# Patient Record
Sex: FEMALE | Race: WHITE | NOT HISPANIC OR LATINO | URBAN - METROPOLITAN AREA
[De-identification: names, ages, dates, MRNs, and addresses within clinical notes are randomized per-mention and may not be internally consistent; named-entity substitution may affect disease eponyms.]

---

## 2017-09-11 ENCOUNTER — RECORDS - HEALTHEAST (OUTPATIENT)
Dept: LAB | Facility: CLINIC | Age: 82
End: 2017-09-11

## 2017-09-11 LAB
CHOLEST SERPL-MCNC: 171 MG/DL
FASTING STATUS PATIENT QL REPORTED: ABNORMAL
HDLC SERPL-MCNC: 42 MG/DL
LDLC SERPL CALC-MCNC: 92 MG/DL
TRIGL SERPL-MCNC: 187 MG/DL

## 2018-09-11 ENCOUNTER — RECORDS - HEALTHEAST (OUTPATIENT)
Dept: LAB | Facility: CLINIC | Age: 83
End: 2018-09-11

## 2018-09-11 LAB
ALBUMIN SERPL-MCNC: 3.7 G/DL (ref 3.5–5)
ALP SERPL-CCNC: 80 U/L (ref 45–120)
ALT SERPL W P-5'-P-CCNC: 27 U/L (ref 0–45)
ANION GAP SERPL CALCULATED.3IONS-SCNC: 10 MMOL/L (ref 5–18)
AST SERPL W P-5'-P-CCNC: 18 U/L (ref 0–40)
BILIRUB SERPL-MCNC: 0.5 MG/DL (ref 0–1)
BUN SERPL-MCNC: 19 MG/DL (ref 8–28)
CALCIUM SERPL-MCNC: 9.6 MG/DL (ref 8.5–10.5)
CHLORIDE BLD-SCNC: 104 MMOL/L (ref 98–107)
CHOLEST SERPL-MCNC: 159 MG/DL
CO2 SERPL-SCNC: 24 MMOL/L (ref 22–31)
CREAT SERPL-MCNC: 1.12 MG/DL (ref 0.6–1.1)
FASTING STATUS PATIENT QL REPORTED: ABNORMAL
GFR SERPL CREATININE-BSD FRML MDRD: 47 ML/MIN/1.73M2
GLUCOSE BLD-MCNC: 230 MG/DL (ref 70–125)
HDLC SERPL-MCNC: 34 MG/DL
LDLC SERPL CALC-MCNC: 98 MG/DL
POTASSIUM BLD-SCNC: 4.7 MMOL/L (ref 3.5–5)
PROT SERPL-MCNC: 6.4 G/DL (ref 6–8)
SODIUM SERPL-SCNC: 138 MMOL/L (ref 136–145)
T4 FREE SERPL-MCNC: 1.4 NG/DL (ref 0.7–1.8)
TRIGL SERPL-MCNC: 135 MG/DL
TSH SERPL DL<=0.005 MIU/L-ACNC: 3.21 UIU/ML (ref 0.3–5)

## 2019-04-08 ENCOUNTER — RECORDS - HEALTHEAST (OUTPATIENT)
Dept: LAB | Facility: CLINIC | Age: 84
End: 2019-04-08

## 2019-04-08 LAB
ALBUMIN SERPL-MCNC: 3.7 G/DL (ref 3.5–5)
ALP SERPL-CCNC: 64 U/L (ref 45–120)
ALT SERPL W P-5'-P-CCNC: 37 U/L (ref 0–45)
ANION GAP SERPL CALCULATED.3IONS-SCNC: 11 MMOL/L (ref 5–18)
AST SERPL W P-5'-P-CCNC: 31 U/L (ref 0–40)
BILIRUB SERPL-MCNC: 0.4 MG/DL (ref 0–1)
BUN SERPL-MCNC: 20 MG/DL (ref 8–28)
CALCIUM SERPL-MCNC: 9.4 MG/DL (ref 8.5–10.5)
CHLORIDE BLD-SCNC: 105 MMOL/L (ref 98–107)
CO2 SERPL-SCNC: 22 MMOL/L (ref 22–31)
CREAT SERPL-MCNC: 1.07 MG/DL (ref 0.6–1.1)
GFR SERPL CREATININE-BSD FRML MDRD: 49 ML/MIN/1.73M2
GLUCOSE BLD-MCNC: 156 MG/DL (ref 70–125)
POTASSIUM BLD-SCNC: 4.9 MMOL/L (ref 3.5–5)
PROT SERPL-MCNC: 5.9 G/DL (ref 6–8)
SODIUM SERPL-SCNC: 138 MMOL/L (ref 136–145)
TSH SERPL DL<=0.005 MIU/L-ACNC: 3.09 UIU/ML (ref 0.3–5)

## 2019-04-10 LAB — BACTERIA SPEC CULT: ABNORMAL

## 2019-10-07 ENCOUNTER — RECORDS - HEALTHEAST (OUTPATIENT)
Dept: LAB | Facility: CLINIC | Age: 84
End: 2019-10-07

## 2019-10-07 LAB
ALBUMIN SERPL-MCNC: 3.9 G/DL (ref 3.5–5)
ALP SERPL-CCNC: 73 U/L (ref 45–120)
ALT SERPL W P-5'-P-CCNC: 15 U/L (ref 0–45)
ANION GAP SERPL CALCULATED.3IONS-SCNC: 8 MMOL/L (ref 5–18)
AST SERPL W P-5'-P-CCNC: 16 U/L (ref 0–40)
BILIRUB SERPL-MCNC: 0.4 MG/DL (ref 0–1)
BUN SERPL-MCNC: 18 MG/DL (ref 8–28)
CALCIUM SERPL-MCNC: 9.6 MG/DL (ref 8.5–10.5)
CHLORIDE BLD-SCNC: 105 MMOL/L (ref 98–107)
CHOLEST SERPL-MCNC: 150 MG/DL
CO2 SERPL-SCNC: 25 MMOL/L (ref 22–31)
CREAT SERPL-MCNC: 1.26 MG/DL (ref 0.6–1.1)
CREAT UR-MCNC: 21.8 MG/DL
FASTING STATUS PATIENT QL REPORTED: ABNORMAL
GFR SERPL CREATININE-BSD FRML MDRD: 41 ML/MIN/1.73M2
GLUCOSE BLD-MCNC: 295 MG/DL (ref 70–125)
HDLC SERPL-MCNC: 46 MG/DL
LDLC SERPL CALC-MCNC: 84 MG/DL
MICROALBUMIN UR-MCNC: <0.5 MG/DL (ref 0–1.99)
MICROALBUMIN/CREAT UR: NORMAL MG/G{CREAT}
POTASSIUM BLD-SCNC: 5.1 MMOL/L (ref 3.5–5)
PROT SERPL-MCNC: 6.2 G/DL (ref 6–8)
SODIUM SERPL-SCNC: 138 MMOL/L (ref 136–145)
TRIGL SERPL-MCNC: 101 MG/DL

## 2021-01-01 ENCOUNTER — COMMUNICATION - HEALTHEAST (OUTPATIENT)
Dept: SCHEDULING | Facility: CLINIC | Age: 86
End: 2021-01-01

## 2021-01-01 ENCOUNTER — RECORDS - HEALTHEAST (OUTPATIENT)
Dept: LAB | Facility: CLINIC | Age: 86
End: 2021-01-01

## 2021-01-01 ENCOUNTER — OFFICE VISIT - HEALTHEAST (OUTPATIENT)
Dept: GERIATRICS | Facility: CLINIC | Age: 86
End: 2021-01-01

## 2021-01-01 ENCOUNTER — AMBULATORY - HEALTHEAST (OUTPATIENT)
Dept: OTHER | Facility: CLINIC | Age: 86
End: 2021-01-01

## 2021-01-01 ENCOUNTER — COMMUNICATION - HEALTHEAST (OUTPATIENT)
Dept: ADMINISTRATIVE | Facility: CLINIC | Age: 86
End: 2021-01-01

## 2021-01-01 ENCOUNTER — AMBULATORY - HEALTHEAST (OUTPATIENT)
Dept: GERIATRICS | Facility: CLINIC | Age: 86
End: 2021-01-01

## 2021-01-01 DIAGNOSIS — E03.9 ACQUIRED HYPOTHYROIDISM: ICD-10-CM

## 2021-01-01 DIAGNOSIS — K44.9 PARAESOPHAGEAL HIATAL HERNIA: ICD-10-CM

## 2021-01-01 DIAGNOSIS — E46 PROTEIN MALNUTRITION (H): ICD-10-CM

## 2021-01-01 DIAGNOSIS — Z71.89 GOALS OF CARE, COUNSELING/DISCUSSION: ICD-10-CM

## 2021-01-01 DIAGNOSIS — I10 ESSENTIAL HYPERTENSION: ICD-10-CM

## 2021-01-01 DIAGNOSIS — E11.9 TYPE 2 DIABETES MELLITUS TREATED WITHOUT INSULIN (H): ICD-10-CM

## 2021-01-01 DIAGNOSIS — N94.89 ADNEXAL MASS: ICD-10-CM

## 2021-01-01 DIAGNOSIS — R62.51 FAILURE TO THRIVE (0-17): ICD-10-CM

## 2021-01-01 DIAGNOSIS — N18.30 STAGE 3 CHRONIC KIDNEY DISEASE, UNSPECIFIED WHETHER STAGE 3A OR 3B CKD (H): ICD-10-CM

## 2021-01-01 DIAGNOSIS — N17.9 ACUTE KIDNEY INJURY (H): ICD-10-CM

## 2021-01-01 DIAGNOSIS — N18.9 ACUTE KIDNEY INJURY SUPERIMPOSED ON CHRONIC KIDNEY DISEASE (H): ICD-10-CM

## 2021-01-01 DIAGNOSIS — N17.9 ACUTE KIDNEY INJURY SUPERIMPOSED ON CHRONIC KIDNEY DISEASE (H): ICD-10-CM

## 2021-01-01 LAB
ALBUMIN SERPL-MCNC: 4 G/DL (ref 3.5–5)
ALP SERPL-CCNC: 60 U/L (ref 45–120)
ALT SERPL W P-5'-P-CCNC: 17 U/L (ref 0–45)
ANION GAP SERPL CALCULATED.3IONS-SCNC: 11 MMOL/L (ref 5–18)
ANION GAP SERPL CALCULATED.3IONS-SCNC: 5 MMOL/L (ref 5–18)
AST SERPL W P-5'-P-CCNC: 20 U/L (ref 0–40)
BILIRUB SERPL-MCNC: 0.5 MG/DL (ref 0–1)
BUN SERPL-MCNC: 18 MG/DL (ref 8–28)
BUN SERPL-MCNC: 20 MG/DL (ref 8–28)
CALCIUM SERPL-MCNC: 7.9 MG/DL (ref 8.5–10.5)
CALCIUM SERPL-MCNC: 9.4 MG/DL (ref 8.5–10.5)
CHLORIDE BLD-SCNC: 111 MMOL/L (ref 98–107)
CHLORIDE BLD-SCNC: 99 MMOL/L (ref 98–107)
CHOLEST SERPL-MCNC: 136 MG/DL
CO2 SERPL-SCNC: 24 MMOL/L (ref 22–31)
CO2 SERPL-SCNC: 25 MMOL/L (ref 22–31)
CREAT SERPL-MCNC: 0.89 MG/DL (ref 0.6–1.1)
CREAT SERPL-MCNC: 0.93 MG/DL (ref 0.6–1.1)
FASTING STATUS PATIENT QL REPORTED: ABNORMAL
GFR SERPL CREATININE-BSD FRML MDRD: 57 ML/MIN/1.73M2
GFR SERPL CREATININE-BSD FRML MDRD: 60 ML/MIN/1.73M2
GLUCOSE BLD-MCNC: 213 MG/DL (ref 70–125)
GLUCOSE BLD-MCNC: 58 MG/DL (ref 70–125)
HDLC SERPL-MCNC: 39 MG/DL
LDLC SERPL CALC-MCNC: 73 MG/DL
MAGNESIUM SERPL-MCNC: 2 MG/DL (ref 1.8–2.6)
POTASSIUM BLD-SCNC: 3.9 MMOL/L (ref 3.5–5)
POTASSIUM BLD-SCNC: 4.2 MMOL/L (ref 3.5–5)
PROT SERPL-MCNC: 6.4 G/DL (ref 6–8)
SODIUM SERPL-SCNC: 135 MMOL/L (ref 136–145)
SODIUM SERPL-SCNC: 140 MMOL/L (ref 136–145)
TRIGL SERPL-MCNC: 121 MG/DL
TSH SERPL DL<=0.005 MIU/L-ACNC: 2.5 UIU/ML (ref 0.3–5)
WBC: 10.5 THOU/UL (ref 4–11)

## 2021-05-25 ENCOUNTER — RECORDS - HEALTHEAST (OUTPATIENT)
Dept: ADMINISTRATIVE | Facility: CLINIC | Age: 86
End: 2021-05-25

## 2021-05-26 ENCOUNTER — RECORDS - HEALTHEAST (OUTPATIENT)
Dept: ADMINISTRATIVE | Facility: CLINIC | Age: 86
End: 2021-05-26

## 2021-05-27 ENCOUNTER — RECORDS - HEALTHEAST (OUTPATIENT)
Dept: ADMINISTRATIVE | Facility: CLINIC | Age: 86
End: 2021-05-27

## 2021-05-27 VITALS
RESPIRATION RATE: 16 BRPM | SYSTOLIC BLOOD PRESSURE: 126 MMHG | HEART RATE: 79 BPM | DIASTOLIC BLOOD PRESSURE: 78 MMHG | OXYGEN SATURATION: 96 % | TEMPERATURE: 98.2 F

## 2021-05-28 ENCOUNTER — RECORDS - HEALTHEAST (OUTPATIENT)
Dept: ADMINISTRATIVE | Facility: CLINIC | Age: 86
End: 2021-05-28

## 2021-05-30 ENCOUNTER — RECORDS - HEALTHEAST (OUTPATIENT)
Dept: ADMINISTRATIVE | Facility: CLINIC | Age: 86
End: 2021-05-30

## 2021-06-15 NOTE — PROGRESS NOTES
[facility]    Facility:   [facility] [[id number]]   Code Status: DNR      CHIEF COMPLAINT/REASON FOR VISIT:  Chief Complaint   Patient presents with     Problem Visit     tcu, asked to see, lencho babin, left francisco mass.       HISTORY:      HPI: Yamila is a 85 y.o. female who was hospitalized February 26, 2021 secondary to evaluation of generalized weakness, fatigue, decreased appetite and diarrhea for about 1 week.  She denies abdominal pain fevers or chills.  Her work-up did include a negative urinalysis along with a BUN of 52 creatinine 2.35 magnesium 2.5 albumin 2.7 white cells of 10.6 and hemoglobin 10.9 and platelets 180.  The CT of the abdomen and pelvis did show a very large popliteal hiatal hernia defect through which nearly the entire stomach and the transverse colon mass.  An indeterminate 10 x 7 x 7 cm complex left adnexal mass to which she had ultrasound of the pelvis which did concur the indeterminate septated cystic lesion in the region of the left adnexa with no normal ovarian tissue identified.  Gynecological consult was recommended.  The CA-125 is 23.6.  CT of the head did not show any evidence of acute intracranial process she does have presumed chronic vascular ischemic changes.  She did have a general surgery consult regarding the hiatal hernia as well as discussion of the hernias being a challenge since the current hernia is large enough that they have: Inside of it.  An open surgery in this patient would be substantial stress to recover from.  In the hospital he did have a palliative care consult.  Regarding the palliative care consult there was a discussion of whether or not she could go on hospice but only if she decided not to have the surgery.  Regarding the diarrhea the CT did show mural thickening throughout the rectum and possibly the ascending colon.  He also has a history of diabetes currently on glipizide as well as a history of hypertension to  which they did hold the losartan secondary to SEBASTIAN.  She now is currently in the transitional care unit to which I did enjoy our visit today.  Very delightful female.  She at this point is not completely convinced that she does want the surgery and therefore did have a chance to talk with her 2 daughters Meredith and Jillian at this point it appears that there is consensus that hospice would be a good decision.  She does have protein malnutrition is very deconditioned and still there is also suspicion if she does have enough cognition and insight to make this decision personally.  The meantime I will check her Accu-Cheks to see if she needs to be on the glipizide also give her boost twice daily per her request.  I went over her laboratory studies.  She has been normotensive and afebrile and also on room air.  Most recent studies on March 9 that showed creatinine now down at 1.07 and hemoglobin 9.5.  The A1c was 7.1 whereas back in August it was 6.6.    Past Medical History:   Diagnosis Date     Diabetes mellitus, type II (H)      Hyperlipidemia      Hypothyroidism      Osteoporosis              Family History   Problem Relation Age of Onset     Colon cancer Mother 74     Breast cancer Sister 48     Breast cancer Maternal Aunt      BRCA 1/2 Neg Hx      Cancer Neg Hx      Endometrial cancer Neg Hx      Ovarian cancer Neg Hx      Social History     Socioeconomic History     Marital status:      Spouse name: Not on file     Number of children: Not on file     Years of education: Not on file     Highest education level: Not on file   Occupational History     Not on file   Social Needs     Financial resource strain: Not on file     Food insecurity     Worry: Not on file     Inability: Not on file     Transportation needs     Medical: Not on file     Non-medical: Not on file   Tobacco Use     Smoking status: Not on file   Substance and Sexual Activity     Alcohol use: Not on file     Drug use: Not on file     Sexual  activity: Not on file   Lifestyle     Physical activity     Days per week: Not on file     Minutes per session: Not on file     Stress: Not on file   Relationships     Social connections     Talks on phone: Not on file     Gets together: Not on file     Attends Religion service: Not on file     Active member of club or organization: Not on file     Attends meetings of clubs or organizations: Not on file     Relationship status: Not on file     Intimate partner violence     Fear of current or ex partner: Not on file     Emotionally abused: Not on file     Physically abused: Not on file     Forced sexual activity: Not on file   Other Topics Concern     Not on file   Social History Narrative     Not on file         Review of Systems  She currently denies any new symptoms of fever chills fatigue cough or cold sore throat postnasal drip wheezing chest pain dizziness vertigo nausea vomiting diarrhea dysuria or headaches.  Does admit to generalized decreased appetite and p.o. intake as well as interest in nutrition.    Current Outpatient Medications   Medication Sig     acetaminophen (TYLENOL) 325 MG tablet Take 2 tablets (650 mg total) by mouth every 4 (four) hours as needed (for pain 1-5/10).     chlorhexidine (PERIDEX) 0.12 % solution Apply 15 mL to the mouth or throat 2 (two) times a day for 10 days.     glipiZIDE (GLUCOTROL XL) 5 MG 24 hr tablet Take 1 tablet (5 mg total) by mouth every evening.     levothyroxine (SYNTHROID, LEVOTHROID) 50 MCG tablet Take 50 mcg by mouth Daily at 6:00 am.     mecobalamin, vitamin B12, 1,000 mcg TbDL Place 1 tablet (1,000 mcg total) under the tongue daily.     melatonin 3 mg Tab tablet Take 1 tablet (3 mg total) by mouth at bedtime.     metoclopramide (REGLAN) 5 MG tablet Take 1 tablet (5 mg total) by mouth 3 (three) times a day before meals for 10 days.     mirtazapine (REMERON) 7.5 MG tablet Take 1 tablet (7.5 mg total) by mouth at bedtime.     multivitamin with minerals (THERA-M) 9  mg iron-400 mcg Tab tablet Take 1 tablet by mouth daily.     omeprazole (PRILOSEC) 20 MG capsule Take 1 capsule (20 mg total) by mouth daily before breakfast.     senna-docusate (PERICOLACE) 8.6-50 mg tablet Take 1 tablet by mouth daily as needed for constipation.     thiamine 100 MG tablet Take 1 tablet (100 mg total) by mouth daily.     traZODone (DESYREL) 50 MG tablet Take 0.5 tablets (25 mg total) by mouth at bedtime as needed for sleep.     zinc sulfate (ZINCATE) 50 mg zinc (220 mg) capsule Take 1 capsule (220 mg total) by mouth daily.       There were no vitals filed for this visit.  Blood pressure 130/84, pulse 78, respirations 18, temperature 97.5 and saturation room air 96%  Physical Exam  Pleasant female in no acute distress.  Head is normocephalic.  Conjunctiva is pink and sclerae clear.  Neck supple without adenopathy.  No thyroid nodules or thyroidomegaly.  No carotid bruits.  Lung sounds are clear throughout.  Cardiovascular S1-S2 regular rate and rhythm.  No lower extremity edema.  Gastrointestinal soft nondistended nontender.  Musculoskeletal generalized weakness and deconditioning.  Denies discomfort.  Psychiatric: Pleasant affect.  LABS:   Lab Results   Component Value Date    OMYYZCIN86 289 02/27/2021     Vitamin D, Total (25-Hydroxy)   Date Value Ref Range Status   04/25/2011 46.1 30.0 - 80.0 ng/mL Final     Comment:                    Deficiency              <10.0 ng/mL               Insufficiency       10.0-29.9 ng/mL               Sufficiency         30.0-80.0 ng/mL               Toxicity (possible)    >100.0 ng/mL     Lab Results   Component Value Date    ALT 25 03/01/2021    AST 20 03/01/2021    ALKPHOS 47 03/01/2021    BILITOT 0.4 03/01/2021     Lab Results   Component Value Date    ALBUMIN 1.9 (L) 03/01/2021     Results for orders placed or performed during the hospital encounter of 02/26/21   Basic Metabolic Panel   Result Value Ref Range    Sodium 141 136 - 145 mmol/L    Potassium 4.6  3.5 - 5.0 mmol/L    Chloride 111 (H) 98 - 107 mmol/L    CO2 25 22 - 31 mmol/L    Anion Gap, Calculation 5 5 - 18 mmol/L    Glucose 144 (H) 70 - 125 mg/dL    Calcium 7.8 (L) 8.5 - 10.5 mg/dL    BUN 18 8 - 28 mg/dL    Creatinine 1.07 0.60 - 1.10 mg/dL    GFR MDRD Af Amer 59 (L) >60 mL/min/1.73m2    GFR MDRD Non Af Amer 49 (L) >60 mL/min/1.73m2       Lab Results   Component Value Date    WBC 12.2 (H) 03/09/2021    HGB 9.5 (L) 03/09/2021    HCT 30.0 (L) 03/09/2021    MCV 88 03/09/2021     03/09/2021     Lab Results   Component Value Date    HGBA1C 7.1 (H) 02/27/2021         ASSESSMENT:      ICD-10-CM    1. Adnexal mass  N94.89    2. Protein malnutrition (H)  E46    3. Essential hypertension  I10    4. Type 2 diabetes mellitus treated without insulin (H)  E11.9        PLAN:    Had a pleasant visit with her today.  We will do Accu-Cheks at least once a day see what her blood sugars are running and then also give her boost twice daily.  Also remove the PICC line in the right bicep.  Discussed the rehabilitation process also previous laboratory studies.    Documentation from total visit 45 minutes which were 50% initially was spent with patient going over her hospitalization as well as her hernia as well as decisions for future treatments for no treatments whatsoever as well as her nutrition, medications, previous laboratory studies, and rehabilitation services and the remainder the time spent talking with her 2 daughters Meredith and Jillian.      Electronically signed by: Michael Duane Johnson, CNP

## 2021-06-15 NOTE — PROGRESS NOTES
[facility]      Facility:    [facility] [[id number]]  Code Status: DNR      Chief Complaint/Reason for Visit:  Chief Complaint   Patient presents with     H & P     Acute kidney injury on stage III chronic kidney disease, hypothyroidism, type 2 diabetes, encounter for palliative care, essential hypertension, hypothyroidism.       HPI:   Yamila is a 85 y.o. female who was recently admitted to the hospital on 2/26/2021.  Brought into the hospital for signs and symptoms of fatigue decreased appetite diarrhea x1 week and generalized weakness.  She has severe malnutrition early dementia and was in acute kidney injury did receive IV fluids.  She had very large hiatal hernia and CT scan was done without contrast showed no obstruction or hydronephrosis.  P creatinine is 2.35 then returned to normal limits.  She did have a non-anion gap metabolic acidosis did resolve and her diarrhea with colitis the diarrhea did resolve.  There is mural thickening throughout the rectum and possibly ascending colon.  Was not able to check C. difficile.  She have type 2 diabetes with chronic kidney disease without long-term insulin use last A1c was 7.1 which is appropriate for her age.  She also had essential hypertension but her blood pressures were in the hospital stay pretty much normotensive and she does have hypothyroidism and she appears euthyroid by exam.  She have low magnesium that was replaced and we will check that here an elevated white count.    Patient is lying in bed comfortably we did get her seated position I did examine her back there are no open lesions but there is some redness along the thoracic spine which she has a kyphotic curve.  This is likely due to pressure in the bed.  She is not in any pain at this time and denies any fevers chills nausea vomiting diarrhea.    Past Medical History:  Past Medical History:   Diagnosis Date     Diabetes mellitus, type II (H)       Hyperlipidemia      Hypothyroidism      Osteoporosis            Surgical History:  Past Surgical History:   Procedure Laterality Date     HYSTERECTOMY       PICC AND MIDLINE TEAM LINE INSERTION  2/28/2021          NJ APPENDECTOMY      Description: Appendectomy;  Recorded: 05/12/2008;     NJ ARTHRODESIS,ANKLE,OPEN      Description: Ankle Arthrodesis Right;  Recorded: 05/12/2008;  Comments: fusion     NJ REMOVAL GALLBLADDER      Description: Cholecystectomy;  Recorded: 05/12/2008;     NJ REMOVAL OF TONSILS,<13 Y/O      Description: Tonsillectomy;  Recorded: 05/12/2008;     NJ TOTAL ABDOM HYSTERECTOMY      Description: Hysterectomy;  Recorded: 05/12/2008;  Comments: ovaries intact; '76     NJ TOTAL KNEE ARTHROPLASTY      Description: Total Knee Arthroplasty;  Recorded: 08/21/2012;     NJ TREAT INTER/SUBTROCH FX,W/PLATE/SCREW      Description: Open Treatment Of A Pertrochanteric Fracture;  Recorded: 05/12/2008;  Comments: 11/06       Family History:   Family History   Problem Relation Age of Onset     Colon cancer Mother 74     Breast cancer Sister 48     Breast cancer Maternal Aunt      BRCA 1/2 Neg Hx      Cancer Neg Hx      Endometrial cancer Neg Hx      Ovarian cancer Neg Hx        Social History:    Social History     Socioeconomic History     Marital status:      Spouse name: None     Number of children: None     Years of education: None     Highest education level: None   Occupational History     None   Social Needs     Financial resource strain: None     Food insecurity     Worry: None     Inability: None     Transportation needs     Medical: None     Non-medical: None   Tobacco Use     Smoking status: None   Substance and Sexual Activity     Alcohol use: None     Drug use: None     Sexual activity: None   Lifestyle     Physical activity     Days per week: None     Minutes per session: None     Stress: None   Relationships     Social connections     Talks on phone: None     Gets together: None     Attends  Druze service: None     Active member of club or organization: None     Attends meetings of clubs or organizations: None     Relationship status: None     Intimate partner violence     Fear of current or ex partner: None     Emotionally abused: None     Physically abused: None     Forced sexual activity: None   Other Topics Concern     None   Social History Narrative     None          Review of Systems   Constitutional:        Patient denies any pain fevers chills nausea vomit diarrhea change in vision hearing taste or smell weakness one-sided chest pain shortness of breath.  Denies any current shortness no polyphagia polydipsia polyuria depression anxiety and the main review of systems is negative.       Vitals:    03/11/21 1300   BP: 126/78   Pulse: 79   Resp: 16   Temp: 98.2  F (36.8  C)   SpO2: 96%       Physical Exam  Constitutional:       General: She is not in acute distress.  HENT:      Head: Normocephalic and atraumatic.      Nose: Nose normal.      Mouth/Throat:      Mouth: Mucous membranes are moist.      Pharynx: Oropharynx is clear.   Eyes:      General:         Right eye: No discharge.         Left eye: No discharge.   Cardiovascular:      Rate and Rhythm: Normal rate and regular rhythm.   Pulmonary:      Effort: Pulmonary effort is normal. No respiratory distress.      Comments: Decreased inspiratory volume.  There were no crackles rales or wheezes.  Musculoskeletal:      Right lower leg: Edema present.      Left lower leg: Edema present.   Skin:     General: Skin is warm and dry.      Comments: Jesus is noted on the upper right thoracic area in the paraspinous muscles.  It is soft and no real palpable hard mass.   Neurological:      Mental Status: She is alert. Mental status is at baseline.   Psychiatric:         Mood and Affect: Mood normal.         Behavior: Behavior normal.         Medication List:  Current Outpatient Medications   Medication Sig     acetaminophen (TYLENOL) 325 MG tablet Take  2 tablets (650 mg total) by mouth every 4 (four) hours as needed (for pain 1-5/10).     chlorhexidine (PERIDEX) 0.12 % solution Apply 15 mL to the mouth or throat 2 (two) times a day for 10 days.     glipiZIDE (GLUCOTROL XL) 5 MG 24 hr tablet Take 1 tablet (5 mg total) by mouth every evening.     levothyroxine (SYNTHROID, LEVOTHROID) 50 MCG tablet Take 50 mcg by mouth Daily at 6:00 am.     mecobalamin, vitamin B12, 1,000 mcg TbDL Place 1 tablet (1,000 mcg total) under the tongue daily.     melatonin 3 mg Tab tablet Take 1 tablet (3 mg total) by mouth at bedtime.     metoclopramide (REGLAN) 5 MG tablet Take 1 tablet (5 mg total) by mouth 3 (three) times a day before meals for 10 days.     mirtazapine (REMERON) 7.5 MG tablet Take 1 tablet (7.5 mg total) by mouth at bedtime.     multivitamin with minerals (THERA-M) 9 mg iron-400 mcg Tab tablet Take 1 tablet by mouth daily.     omeprazole (PRILOSEC) 20 MG capsule Take 1 capsule (20 mg total) by mouth daily before breakfast.     senna-docusate (PERICOLACE) 8.6-50 mg tablet Take 1 tablet by mouth daily as needed for constipation.     thiamine 100 MG tablet Take 1 tablet (100 mg total) by mouth daily.     traZODone (DESYREL) 50 MG tablet Take 0.5 tablets (25 mg total) by mouth at bedtime as needed for sleep.     zinc sulfate (ZINCATE) 50 mg zinc (220 mg) capsule Take 1 capsule (220 mg total) by mouth daily.       Labs: On 3/9/2021 sodium is 141, potassium 4.6, chloride was 111, CO2 is 25, anion gap was 5, glucose was 144, calcium was 7.8, BUN was 18, creatinine 1.07, GFR was 49.  White count was 12.2, hemoglobin is 9.5, platelets of 259,000.  Vitamin B1 was 271.      Assessment:    ICD-10-CM    1. Adnexal mass  N94.89    2. Protein malnutrition (H)  E46    3. Essential hypertension  I10    4. Type 2 diabetes mellitus treated without insulin (H)  E11.9    5. Acute kidney injury (H)  N17.9    6. Stage 3 chronic kidney disease, unspecified whether stage 3a or 3b CKD  N18.30     7. Acquired hypothyroidism  E03.9    8. Paraesophageal hiatal hernia  K44.9        Plan: This time speech therapy evaluate and treat registered dietitian will also evaluate and treat house supplement will be given 3 times daily likely with protein.  Mighty shakes would be appropriate at this time.  Weights to be done a daily basis to assess nutrition and hydration and a basic metabolic profile white count be done tomorrow second acute kidney injury as well as leukocytosis.  Will check magnesium tomorrow secondary low magnesium levels and continue with physical and Occupational Therapy.  We will also check blood sugars 4 times daily at this time and continue to monitor above medical problems.  No other changes to care plan at this time.        Electronically signed by: Adolfo Jaramillo DO

## 2021-06-15 NOTE — PROGRESS NOTES
[facility]    Facility:   [facility] [[id number]]   Code Status: DNR      CHIEF COMPLAINT/REASON FOR VISIT:  Chief Complaint   Patient presents with     Problem Visit     medm, mgmt, rehab, sleep       HISTORY:      HPI: Yamila is a 85 y.o. female who remains in the transitional care unit room 210 and who I had the opportunity to revisit with not only secondary to her hospitalization February 26, 2021 secondary generalized weakness and fatigue as well as a very large hiatal hernia defect through the entire stomach and transverse colon mass and also a indeterminate left adnexal mass but also discussed her medications, diabetes, nutrition and goals of care.  Regarding the goals of care she at this point is considering hospice type of care but did not want to make that decision herself today which I certainly on an respect and she wants to talk to family members.  She does admit to generalized decline and generalized fatigue and overall not getting any better.  In the meantime does not have a significant appetite whatsoever.  There have not been any recent weights.  Her blood sugars have been less than 140 so the glipizide will not be discontinued including the Accu-Cheks.  Last Tylenol given as needed was on March 12.  I will discontinue the melatonin and mirtazapine.  She does not feel depressed but the mirtazapine was given to her with the hope of the side effect of improving her appetite.  She does not want the melatonin.  She also would like to discontinue the B12, multivitamin, thiamine, and zinc.  She is even thinking about discontinue the Synthroid but we will keep that on the docket for little while longer.  Otherwise has had meetings with family and pretty much is bedbound at this time.    Past Medical History:   Diagnosis Date     Diabetes mellitus, type II (H)      Hyperlipidemia      Hypothyroidism      Osteoporosis              Family History   Problem Relation  Age of Onset     Colon cancer Mother 74     Breast cancer Sister 48     Breast cancer Maternal Aunt      BRCA 1/2 Neg Hx      Cancer Neg Hx      Endometrial cancer Neg Hx      Ovarian cancer Neg Hx      Social History     Socioeconomic History     Marital status:      Spouse name: Not on file     Number of children: Not on file     Years of education: Not on file     Highest education level: Not on file   Occupational History     Not on file   Social Needs     Financial resource strain: Not on file     Food insecurity     Worry: Not on file     Inability: Not on file     Transportation needs     Medical: Not on file     Non-medical: Not on file   Tobacco Use     Smoking status: Not on file   Substance and Sexual Activity     Alcohol use: Not on file     Drug use: Not on file     Sexual activity: Not on file   Lifestyle     Physical activity     Days per week: Not on file     Minutes per session: Not on file     Stress: Not on file   Relationships     Social connections     Talks on phone: Not on file     Gets together: Not on file     Attends Sabianism service: Not on file     Active member of club or organization: Not on file     Attends meetings of clubs or organizations: Not on file     Relationship status: Not on file     Intimate partner violence     Fear of current or ex partner: Not on file     Emotionally abused: Not on file     Physically abused: Not on file     Forced sexual activity: Not on file   Other Topics Concern     Not on file   Social History Narrative     Not on file         Review of Systems  She currently denies any new symptoms of fever chills fatigue cough or cold sore throat postnasal drip wheezing chest pain dizziness vertigo nausea vomiting diarrhea dysuria or headaches.  Does admit to generalized decreased appetite and p.o. intake as well as interest in nutrition.      Current Outpatient Medications:      acetaminophen (TYLENOL) 325 MG tablet, Take 2 tablets (650 mg total) by mouth  every 4 (four) hours as needed (for pain 1-5/10)., Disp:  , Rfl: 0     chlorhexidine (PERIDEX) 0.12 % solution, Apply 15 mL to the mouth or throat 2 (two) times a day for 10 days., Disp: , Rfl: 0     levothyroxine (SYNTHROID, LEVOTHROID) 50 MCG tablet, Take 50 mcg by mouth Daily at 6:00 am., Disp: , Rfl:      metoclopramide (REGLAN) 5 MG tablet, Take 1 tablet (5 mg total) by mouth 3 (three) times a day before meals for 10 days., Disp: , Rfl: 0     omeprazole (PRILOSEC) 20 MG capsule, Take 1 capsule (20 mg total) by mouth daily before breakfast., Disp:  , Rfl: 0     senna-docusate (PERICOLACE) 8.6-50 mg tablet, Take 1 tablet by mouth daily as needed for constipation., Disp:  , Rfl: 0     thiamine 100 MG tablet, Take 1 tablet (100 mg total) by mouth daily., Disp:  , Rfl: 0     traZODone (DESYREL) 50 MG tablet, Take 0.5 tablets (25 mg total) by mouth at bedtime as needed for sleep., Disp: , Rfl: 0    There were no vitals filed for this visit.  Blood pressure 121/73, pulse 65, temperature 98.2 and saturation room air 97%  Physical Exam  Pleasant female in no acute distress.  Head is normocephalic.  Conjunctiva is pink and sclerae clear.  Neck supple without adenopathy. .  Lung sounds are clear throughout.  Cardiovascular S1-S2 regular rate and rhythm.  No lower extremity edema.  Gastrointestinal soft nondistended nontender.  Musculoskeletal generalized weakness and deconditioning.  Denies discomfort.  Psychiatric: Pleasant affect.      LABS:   Lab Results   Component Value Date    ALBUMIN 1.9 (L) 03/01/2021     Results for orders placed or performed in visit on 03/12/21   Basic Metabolic Panel   Result Value Ref Range    Sodium 140 136 - 145 mmol/L    Potassium 3.9 3.5 - 5.0 mmol/L    Chloride 111 (H) 98 - 107 mmol/L    CO2 24 22 - 31 mmol/L    Anion Gap, Calculation 5 5 - 18 mmol/L    Glucose 58 (LL) 70 - 125 mg/dL    Calcium 7.9 (L) 8.5 - 10.5 mg/dL    BUN 18 8 - 28 mg/dL    Creatinine 0.89 0.60 - 1.10 mg/dL    GFR  MDRD Af Amer >60 >60 mL/min/1.73m2    GFR MDRD Non Af Amer 60 (L) >60 mL/min/1.73m2           ASSESSMENT:      ICD-10-CM    1. Paraesophageal hiatal hernia  K44.9    2. Goals of care, counseling/discussion  Z71.89    3. Type 2 diabetes mellitus treated without insulin (H)  E11.9    4. Acquired hypothyroidism  E03.9        PLAN:    She at this point would like to discontinue a number of medications that I aforementioned.  I did try to call the daughter Jillian today but there was no answer.  She is strongly considering hospice due to at this point unable to withstand a major surgery to fix her hiatal hernia as well as the uncertainty of the adnexal mass.  Her appetite is not significant whatsoever and barely getting even in the nutrient supplements.  She states that she is comfortable.  She will let me know in the near future what her plans are.        Electronically signed by: Michael Duane Johnson, CNP

## 2021-06-15 NOTE — PROGRESS NOTES
[facility]    Facility:   [facility] [[id number]]   Code Status: DNR      CHIEF COMPLAINT/REASON FOR VISIT:  Chief Complaint   Patient presents with     Problem Visit     ftt, prot mal, dm       HISTORY:      HPI: Yamila is a 85 y.o. female who I had the pleasure to revisit with today not only secondary to her hospitalization February 26, 2021 secondary generalized weakness and fatigue along with diarrhea however her work-up did include a very large hiatal hernia defect through the entire stomach and transverse colon mass as well as an indeterminant left adnexal mass but also discussing rehabilitation, laboratory studies, blood pressures and nutrition.  Regarding therapy she has initiated therapy she does have generalized weakness.  She does have a wonderful 2 of cirrhosis in her room.  She has been normotensive and afebrile and also on room air.  Weight 91 pounds.  Blood sugars ranging  glipizide at 5 mg and will monitor over the weekend perhaps drop the glipizide altogether.  For pain she did take a Tylenol yesterday for sleep she is on melatonin mirtazapine and trazodone.  She does have a BMP magnesium and white cell ordered for today.  Getting extra nutrient supplements.  We did have a wonderful healthy conversation today regarding palliative and discussion of goals of care including hospice care due to the hospital findings as well as the difficulty for any form of recovery as well as the purpose of hospice and comfort.  She was amenable to the conversation and actually did ask a number of questions and did have a nice reciprocity of information.    Past Medical History:   Diagnosis Date     Diabetes mellitus, type II (H)      Hyperlipidemia      Hypothyroidism      Osteoporosis              Family History   Problem Relation Age of Onset     Colon cancer Mother 74     Breast cancer Sister 48     Breast cancer Maternal Aunt      BRCA 1/2 Neg Hx      Cancer Neg Hx       Endometrial cancer Neg Hx      Ovarian cancer Neg Hx      Social History     Socioeconomic History     Marital status:      Spouse name: Not on file     Number of children: Not on file     Years of education: Not on file     Highest education level: Not on file   Occupational History     Not on file   Social Needs     Financial resource strain: Not on file     Food insecurity     Worry: Not on file     Inability: Not on file     Transportation needs     Medical: Not on file     Non-medical: Not on file   Tobacco Use     Smoking status: Not on file   Substance and Sexual Activity     Alcohol use: Not on file     Drug use: Not on file     Sexual activity: Not on file   Lifestyle     Physical activity     Days per week: Not on file     Minutes per session: Not on file     Stress: Not on file   Relationships     Social connections     Talks on phone: Not on file     Gets together: Not on file     Attends [employer] service: Not on file     Active member of club or organization: Not on file     Attends meetings of clubs or organizations: Not on file     Relationship status: Not on file     Intimate partner violence     Fear of current or ex partner: Not on file     Emotionally abused: Not on file     Physically abused: Not on file     Forced sexual activity: Not on file   Other Topics Concern     Not on file   Social History Narrative     Not on file         Review of Systems  She currently denies any new symptoms of fever chills fatigue cough or cold sore throat postnasal drip wheezing chest pain dizziness vertigo nausea vomiting diarrhea dysuria or headaches.  Does admit to generalized decreased appetite and p.o. intake as well as interest in nutrition.      Current Outpatient Medications:      acetaminophen (TYLENOL) 325 MG tablet, Take 2 tablets (650 mg total) by mouth every 4 (four) hours as needed (for pain 1-5/10)., Disp:  , Rfl: 0     chlorhexidine (PERIDEX) 0.12 % solution, Apply 15 mL to the mouth or  throat 2 (two) times a day for 10 days., Disp: , Rfl: 0     glipiZIDE (GLUCOTROL XL) 5 MG 24 hr tablet, Take 1 tablet (5 mg total) by mouth every evening., Disp: , Rfl: 0     levothyroxine (SYNTHROID, LEVOTHROID) 50 MCG tablet, Take 50 mcg by mouth Daily at 6:00 am., Disp: , Rfl:      mecobalamin, vitamin B12, 1,000 mcg TbDL, Place 1 tablet (1,000 mcg total) under the tongue daily., Disp:  , Rfl: 0     melatonin 3 mg Tab tablet, Take 1 tablet (3 mg total) by mouth at bedtime., Disp:  , Rfl: 0     metoclopramide (REGLAN) 5 MG tablet, Take 1 tablet (5 mg total) by mouth 3 (three) times a day before meals for 10 days., Disp: , Rfl: 0     mirtazapine (REMERON) 7.5 MG tablet, Take 1 tablet (7.5 mg total) by mouth at bedtime., Disp:  , Rfl: 0     multivitamin with minerals (THERA-M) 9 mg iron-400 mcg Tab tablet, Take 1 tablet by mouth daily., Disp:  , Rfl: 0     omeprazole (PRILOSEC) 20 MG capsule, Take 1 capsule (20 mg total) by mouth daily before breakfast., Disp:  , Rfl: 0     senna-docusate (PERICOLACE) 8.6-50 mg tablet, Take 1 tablet by mouth daily as needed for constipation., Disp:  , Rfl: 0     thiamine 100 MG tablet, Take 1 tablet (100 mg total) by mouth daily., Disp:  , Rfl: 0     traZODone (DESYREL) 50 MG tablet, Take 0.5 tablets (25 mg total) by mouth at bedtime as needed for sleep., Disp: , Rfl: 0     zinc sulfate (ZINCATE) 50 mg zinc (220 mg) capsule, Take 1 capsule (220 mg total) by mouth daily., Disp:  , Rfl: 0    There were no vitals filed for this visit.  Blood pressure 126/65, pulse 80, respirations 16 and temperature 97.7  Physical Exam  Pleasant female in no acute distress.  Head is normocephalic.  Conjunctiva is pink and sclerae clear.  Neck supple without adenopathy.  No thyroid nodules or thyroidomegaly.  Lung sounds are clear throughout.  Cardiovascular S1-S2 regular rate and rhythm.  No lower extremity edema.  Gastrointestinal soft nondistended nontender.  Musculoskeletal generalized weakness and  deconditioning.  Denies discomfort.  Psychiatric: Pleasant affect.    LABS:   Lab Results   Component Value Date    ALT 25 03/01/2021    AST 20 03/01/2021    ALKPHOS 47 03/01/2021    BILITOT 0.4 03/01/2021     Results for orders placed or performed during the hospital encounter of 02/26/21   Basic Metabolic Panel   Result Value Ref Range    Sodium 141 136 - 145 mmol/L    Potassium 4.6 3.5 - 5.0 mmol/L    Chloride 111 (H) 98 - 107 mmol/L    CO2 25 22 - 31 mmol/L    Anion Gap, Calculation 5 5 - 18 mmol/L    Glucose 144 (H) 70 - 125 mg/dL    Calcium 7.8 (L) 8.5 - 10.5 mg/dL    BUN 18 8 - 28 mg/dL    Creatinine 1.07 0.60 - 1.10 mg/dL    GFR MDRD Af Amer 59 (L) >60 mL/min/1.73m2    GFR MDRD Non Af Amer 49 (L) >60 mL/min/1.73m2       Lab Results   Component Value Date    ALBUMIN 1.9 (L) 03/01/2021         ASSESSMENT:      ICD-10-CM    1. Paraesophageal hiatal hernia  K44.9    2. Type 2 diabetes mellitus treated without insulin (H)  E11.9    3. Goals of care, counseling/discussion  Z71.89    4. Essential hypertension  I10        PLAN:    We did have a healthy conversation today and overall she is not opposed to the hospice idea but would still like to contemplate and consider her options over the weekend and continue with therapy through the weekend.  In the meantime we will continue to manage and follow her chronic medical conditions as well as her wishes.    For documentation purposes total visit 35 minutes which over 50% was spent with the patient going over her care, medications, previous laboratory studies, nutrition extra nutrient supplements, pain management as well as discussion of palliative and hospice care.      Electronically signed by: Michael Duane Johnson, CNP

## 2021-06-15 NOTE — TELEPHONE ENCOUNTER
The NP from  Palliative Care called asking to get a message to Dr. Delgado to call the patient's daughter Jillian. She did not specify why and was in a hurry. The patient does not look to have a Consent to Communicate on file. Jillian can be reached at [phone].

## 2021-06-16 PROBLEM — N94.89 ADNEXAL MASS: Status: ACTIVE | Noted: 2021-01-01

## 2021-06-16 PROBLEM — R19.7 DIARRHEA: Status: ACTIVE | Noted: 2021-01-01

## 2021-06-16 PROBLEM — N17.9 ACUTE RENAL FAILURE SUPERIMPOSED ON STAGE 3 CHRONIC KIDNEY DISEASE, UNSPECIFIED ACUTE RENAL FAILURE TYPE, UNSPECIFIED WHETHER STAGE 3A OR 3B CKD (H): Status: ACTIVE | Noted: 2021-01-01

## 2021-06-16 PROBLEM — N94.9 ADNEXAL CYST: Status: ACTIVE | Noted: 2021-01-01

## 2021-06-16 PROBLEM — N18.30 ACUTE RENAL FAILURE SUPERIMPOSED ON STAGE 3 CHRONIC KIDNEY DISEASE, UNSPECIFIED ACUTE RENAL FAILURE TYPE, UNSPECIFIED WHETHER STAGE 3A OR 3B CKD (H): Status: ACTIVE | Noted: 2021-01-01

## 2021-06-16 NOTE — PROGRESS NOTES
Code Status:  DNR  Visit Type: Discharge Summary     Facility:  [facility] [[id number]]         Facility Type: [facility] (Long Term Care, LTC)  PCP:  Myriam Whitehead CNP    Phone:  [phone]    Fax:  [phone]      Discharge Diagnosis:  Generalized weakness and fatigue with FTT    History of Present Illness: Yamila Chavez is a 85 y.o. female who I am seeing today for discharge from LTC. Pt recently admitted to LTC from the TCU.  Patient recently hospitalized on 2/26/2021 secondary to generalized weakness and fatigue along with diarrhea.  Past medical history includes severe malnutrition, early dementia, acute kidney injury on chronic kidney disease stage III, colitis and hiatal hernia. Patient found to have a large esophageal hiatal hernia with intrathoracic stomach and part of the transverse colon.  This has been present for many years.  She was placed on PPI.  Patient declined surgery. This could be contributory to her severe malnutrition.  Patient with weight loss of 4.6 pounds in less than 1 month.  Decreased oral intake.  Severe fat loss.  Loss of taste progressive.  Covid negative.  Thiamine replaced.  B12 replaced.  Patient started on zinc.  Swallow study showed patient should avoid straws.  Complex left cystic adnexal mass.  TVUS showed 9.4 x 5.8 x 6.2 cm complex cystic mass with few thin septations.  Left ovary absent with vascularity along the periphery of complex cystic structure.   >23.  Diarrhea with colitis. CT showed apparent mural thickening throughout the rectum and possibly the ascending colon. Unable to collect specimens as no further diarrhea.  Acute kidney injury on chronic kidney disease stage III.  CTA of the abdomen pelvis without contrast showed no obstruction or hydronephrosis.  Her creatinine peaked to 2.35.  Metabolic acidosis secondary to renal failure diarrhea.  This did resolve with fluids.  Type 2 diabetes mellitus without long-term insulin.   Hemoglobin A1c 7.1 on 2/27/2021.  Glipizide held secondary to malnutrition.  Hypertension.  Losartan held.  Hypothyroidism on supplement.  TSH on 2/21 was 2.5.  Cognitive impairment with acute mild encephalopathy.  Slums 13.  CT of the head negative.  B12 low normal and replaced.  Folate normal.  Thiamine replaced.  Patient started on Remeron 7.5 mg.  Vitamin B12 insufficiency.  B12 supplemented.    Today patient sitting up in wheelchair.  Cognitive impairment noted.  She is very thin and frail.  Nursing staff report continued poor oral intake.  She continues on Tylenol for pain.  Continues on Remeron for appetite stimulant.  I did talk with both nursing and the  today.  Patient plans to discharge home on hospice.  They have hired round-the-clock care.  We are recommending 24-hour care.  Blood pressure satisfactory.  TSH on supplement.  Nursing staff reports she is having regular bowel movements.  She continues on Peridex mouthwash for poor dentition.  Patient oral supplements must of been discontinued during her TCU stay for she is no longer on vitamin B12, thiamine or zinc.      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      Hypothyroidism      Essential hypertension      Urinary Frequency      Osteoporosis      Hyperlipidemia      Herpes Zoster (Shingles)      Adenocarcinoma Of The Large Intestine      Anxiety      Arthritis      Type 2 diabetes mellitus treated without insulin (H)      Acute renal failure superimposed on stage 3 chronic kidney disease, unspecified acute renal failure type, unspecified whether stage 3a or 3b CKD (H) 02/26/2021     Diarrhea 02/27/2021     Adnexal cyst 02/27/2021     Paraesophageal hiatal hernia      Encounter for palliative care      Goals of  care, counseling/discussion      Adnexal mass 03/10/2021     Resolved Ambulatory Problems     Diagnosis Date Noted     Fatigue      Past Medical History:   Diagnosis Date     Diabetes mellitus, type II (H)      Hyperlipidemia        Discharge Medications:    Outpatient Encounter Medications as of 3/16/2021   Medication Sig Dispense Refill     acetaminophen (TYLENOL) 325 MG tablet Take 2 tablets (650 mg total) by mouth every 4 (four) hours as needed (for pain 1-5/10).  0     chlorhexidine (PERIDEX) 0.12 % solution Apply 15 mL to the mouth or throat 2 (two) times a day for 10 days.  0     levothyroxine (SYNTHROID, LEVOTHROID) 50 MCG tablet Take 50 mcg by mouth Daily at 6:00 am.       metoclopramide (REGLAN) 5 MG tablet Take 1 tablet (5 mg total) by mouth 3 (three) times a day before meals for 10 days.  0     omeprazole (PRILOSEC) 20 MG capsule Take 1 capsule (20 mg total) by mouth daily before breakfast.  0     senna-docusate (PERICOLACE) 8.6-50 mg tablet Take 1 tablet by mouth daily as needed for constipation.  0     thiamine 100 MG tablet Take 1 tablet (100 mg total) by mouth daily.  0     traZODone (DESYREL) 50 MG tablet Take 0.5 tablets (25 mg total) by mouth at bedtime as needed for sleep.  0     No facility-administered encounter medications on file as of 3/16/2021.      Allergies   Allergen Reactions     Atorvastatin      Hydrocodone-Acetaminophen          Review of Systems   No fevers or chills. No headache, lightheadedness or dizziness. No SOB, chest pains or palpitations. Appetite is poor.  No nausea, vomiting, constipation or diarrhea. No dysuria, frequency, burning or pain with urination.  Patient denies any pain.  Otherwise review of systems are negative.     Physical Exam   PHYSICAL EXAMINATION:  Vital signs: /70, pulse 83, respirations 20, temperature 98.3, O2 sat 90% on room air.  Weight 91.8 pounds.  General: Awake, Alert, oriented x1, appropriately, follows simple commands  HEENT:PERRLA, Pink  conjunctiva, anicteric sclerae, dry oral mucosa.  Poor dentition  NECK: Supple, without any lymphadenopathy, or masses  CVS:  S1  S2, without murmur or gallop.   LUNG: Clear to auscultation, No wheezes, rales or rhonci.  Somewhat diminished in the bases.  BACK: Moderate kyphosis of the thoracic spine  ABDOMEN: Soft, thin, nontender to palpation, with positive bowel sounds  EXTREMITIES: Moves both upper and lower extremities with diffuse weakness, trace pedal edema, no calf tenderness.  Thin and frail extremities.  SKIN: Warm and dry  NEUROLOGIC: Intact, pulses palpable  PSYCHIATRIC: Cognitive impairment noted.            Labs:  All labs reviewed in the nursing home record.    Assessment and Plan:  1. Paraesophageal hiatal hernia  Pt plans to sign onto hospice upon discharge home.    2. Adnexal mass  Family is going to forgo follow up.    3. Protein malnutrition (H)  Continues with poor oral nutrition. Started on Remeron for appetite stimulant.    4. Acute kidney injury superimposed on chronic kidney disease (H)  Creatine 0.89.    5. Type 2 diabetes mellitus treated without insulin (H)  Diet liberalized 2/t to poor oral intake.    6. Acquired hypothyroidism  Continues on supplement.    7. Essential hypertension  Satisfactory controlled.    8. Failure to thrive (0-17)       Ok to discharge home with current meds. Hospice eval and treat. We are recommending 24 hour care.       Greater than 35 minutes spent in coordination of care including interviewing pt and collaborating with nursing staff and  for discharge.     Electronically signed by: Myriam Whitehead, ABRAHAM

## 2021-06-21 NOTE — LETTER
Letter by Deborah Melchor RN at      Author: Deborah Melchor RN Service: -- Author Type: --    Filed:  Encounter Date: 3/23/2021 Status: (Other)       [facility]  [facility]  [address]   [place] [address]        Yamilamanuelito Chavez  [address] [address]        Dear Yamila    We have reviewed the Health Care Directive dated 8-24-05 which you presented for addition to your medical record. Unfortunately, our review indicates the document is not legally valid for the following reason(s): Your signature was not witnessed or notarized.  In order to create a legal document you will need to have your signature witnessed by 2 people or notarized. Per state statute witnesses and notaries cannot be named as Health Care Agents in your document. Also, only one witness can be an employee of the Health Care  system caring for you at the time you completed your document.          We greatly value the opportunity to assist you in documenting your choices and to honor your   wishes. We apologize for any inconvenience. We have several options to assist you in updating   your document so it meets legal requirements.     COPIES of completed Health Care Directives can be brought or mailed to any of our   locations, including the address listed above. You can also email a copy to [email or web address] .     For additional assistance or questions you can email us at [email or web address] or call [phone]      Sincerely,     Katya [facility]  [facility]  Email us: [email or web address] Call us: [phone]  Visit at: [email or web address]

## 2021-06-21 NOTE — LETTER
Letter by Johnson, Michael Duane, CNP at      Author: Johnson, Michael Duane, CNP Service: -- Author Type: --    Filed:  Encounter Date: 3/10/2021 Status: (Other)         Patient: Yamila Chavez   MR Number: [id number]   YOB: 1935   Date of Visit: 3/10/2021     [facility]    Facility:   [facility] [[id number]]   Code Status: DNR      CHIEF COMPLAINT/REASON FOR VISIT:  Chief Complaint   Patient presents with   ? Problem Visit     tcu, asked to see, talya, gilbertoti, left francisco mass.       HISTORY:      HPI: Yamila is a 85 y.o. female who was hospitalized February 26, 2021 secondary to evaluation of generalized weakness, fatigue, decreased appetite and diarrhea for about 1 week.  She denies abdominal pain fevers or chills.  Her work-up did include a negative urinalysis along with a BUN of 52 creatinine 2.35 magnesium 2.5 albumin 2.7 white cells of 10.6 and hemoglobin 10.9 and platelets 180.  The CT of the abdomen and pelvis did show a very large popliteal hiatal hernia defect through which nearly the entire stomach and the transverse colon mass.  An indeterminate 10 x 7 x 7 cm complex left adnexal mass to which she had ultrasound of the pelvis which did concur the indeterminate septated cystic lesion in the region of the left adnexa with no normal ovarian tissue identified.  Gynecological consult was recommended.  The CA-125 is 23.6.  CT of the head did not show any evidence of acute intracranial process she does have presumed chronic vascular ischemic changes.  She did have a general surgery consult regarding the hiatal hernia as well as discussion of the hernias being a challenge since the current hernia is large enough that they have: Inside of it.  An open surgery in this patient would be substantial stress to recover from.  In the hospital he did have a palliative care consult.  Regarding the palliative care consult there was a discussion of whether or not she  could go on hospice but only if she decided not to have the surgery.  Regarding the diarrhea the CT did show mural thickening throughout the rectum and possibly the ascending colon.  He also has a history of diabetes currently on glipizide as well as a history of hypertension to which they did hold the losartan secondary to SEBASTIAN.  She now is currently in the transitional care unit to which I did enjoy our visit today.  Very delightful female.  She at this point is not completely convinced that she does want the surgery and therefore did have a chance to talk with her 2 daughters Meredith and Jillian at this point it appears that there is consensus that hospice would be a good decision.  She does have protein malnutrition is very deconditioned and still there is also suspicion if she does have enough cognition and insight to make this decision personally.  The meantime I will check her Accu-Cheks to see if she needs to be on the glipizide also give her boost twice daily per her request.  I went over her laboratory studies.  She has been normotensive and afebrile and also on room air.  Most recent studies on March 9 that showed creatinine now down at 1.07 and hemoglobin 9.5.  The A1c was 7.1 whereas back in August it was 6.6.    Past Medical History:   Diagnosis Date   ? Diabetes mellitus, type II (H)    ? Hyperlipidemia    ? Hypothyroidism    ? Osteoporosis              Family History   Problem Relation Age of Onset   ? Colon cancer Mother 74   ? Breast cancer Sister 48   ? Breast cancer Maternal Aunt    ? BRCA 1/2 Neg Hx    ? Cancer Neg Hx    ? Endometrial cancer Neg Hx    ? Ovarian cancer Neg Hx      Social History     Socioeconomic History   ? Marital status:      Spouse name: Not on file   ? Number of children: Not on file   ? Years of education: Not on file   ? Highest education level: Not on file   Occupational History   ? Not on file   Social Needs   ? Financial resource strain: Not on file   ? Food  insecurity     Worry: Not on file     Inability: Not on file   ? Transportation needs     Medical: Not on file     Non-medical: Not on file   Tobacco Use   ? Smoking status: Not on file   Substance and Sexual Activity   ? Alcohol use: Not on file   ? Drug use: Not on file   ? Sexual activity: Not on file   Lifestyle   ? Physical activity     Days per week: Not on file     Minutes per session: Not on file   ? Stress: Not on file   Relationships   ? Social connections     Talks on phone: Not on file     Gets together: Not on file     Attends Buddhism service: Not on file     Active member of club or organization: Not on file     Attends meetings of clubs or organizations: Not on file     Relationship status: Not on file   ? Intimate partner violence     Fear of current or ex partner: Not on file     Emotionally abused: Not on file     Physically abused: Not on file     Forced sexual activity: Not on file   Other Topics Concern   ? Not on file   Social History Narrative   ? Not on file         Review of Systems  She currently denies any new symptoms of fever chills fatigue cough or cold sore throat postnasal drip wheezing chest pain dizziness vertigo nausea vomiting diarrhea dysuria or headaches.  Does admit to generalized decreased appetite and p.o. intake as well as interest in nutrition.    Current Outpatient Medications   Medication Sig   ? acetaminophen (TYLENOL) 325 MG tablet Take 2 tablets (650 mg total) by mouth every 4 (four) hours as needed (for pain 1-5/10).   ? chlorhexidine (PERIDEX) 0.12 % solution Apply 15 mL to the mouth or throat 2 (two) times a day for 10 days.   ? glipiZIDE (GLUCOTROL XL) 5 MG 24 hr tablet Take 1 tablet (5 mg total) by mouth every evening.   ? levothyroxine (SYNTHROID, LEVOTHROID) 50 MCG tablet Take 50 mcg by mouth Daily at 6:00 am.   ? mecobalamin, vitamin B12, 1,000 mcg TbDL Place 1 tablet (1,000 mcg total) under the tongue daily.   ? melatonin 3 mg Tab tablet Take 1 tablet (3 mg  total) by mouth at bedtime.   ? metoclopramide (REGLAN) 5 MG tablet Take 1 tablet (5 mg total) by mouth 3 (three) times a day before meals for 10 days.   ? mirtazapine (REMERON) 7.5 MG tablet Take 1 tablet (7.5 mg total) by mouth at bedtime.   ? multivitamin with minerals (THERA-M) 9 mg iron-400 mcg Tab tablet Take 1 tablet by mouth daily.   ? omeprazole (PRILOSEC) 20 MG capsule Take 1 capsule (20 mg total) by mouth daily before breakfast.   ? senna-docusate (PERICOLACE) 8.6-50 mg tablet Take 1 tablet by mouth daily as needed for constipation.   ? thiamine 100 MG tablet Take 1 tablet (100 mg total) by mouth daily.   ? traZODone (DESYREL) 50 MG tablet Take 0.5 tablets (25 mg total) by mouth at bedtime as needed for sleep.   ? zinc sulfate (ZINCATE) 50 mg zinc (220 mg) capsule Take 1 capsule (220 mg total) by mouth daily.       There were no vitals filed for this visit.  Blood pressure 130/84, pulse 78, respirations 18, temperature 97.5 and saturation room air 96%  Physical Exam  Pleasant female in no acute distress.  Head is normocephalic.  Conjunctiva is pink and sclerae clear.  Neck supple without adenopathy.  No thyroid nodules or thyroidomegaly.  No carotid bruits.  Lung sounds are clear throughout.  Cardiovascular S1-S2 regular rate and rhythm.  No lower extremity edema.  Gastrointestinal soft nondistended nontender.  Musculoskeletal generalized weakness and deconditioning.  Denies discomfort.  Psychiatric: Pleasant affect.  LABS:   Lab Results   Component Value Date    OALOEJZX79 289 02/27/2021     Vitamin D, Total (25-Hydroxy)   Date Value Ref Range Status   04/25/2011 46.1 30.0 - 80.0 ng/mL Final     Comment:                    Deficiency              <10.0 ng/mL               Insufficiency       10.0-29.9 ng/mL               Sufficiency         30.0-80.0 ng/mL               Toxicity (possible)    >100.0 ng/mL     Lab Results   Component Value Date    ALT 25 03/01/2021    AST 20 03/01/2021    ALKPHOS 47  03/01/2021    BILITOT 0.4 03/01/2021     Lab Results   Component Value Date    ALBUMIN 1.9 (L) 03/01/2021     Results for orders placed or performed during the hospital encounter of 02/26/21   Basic Metabolic Panel   Result Value Ref Range    Sodium 141 136 - 145 mmol/L    Potassium 4.6 3.5 - 5.0 mmol/L    Chloride 111 (H) 98 - 107 mmol/L    CO2 25 22 - 31 mmol/L    Anion Gap, Calculation 5 5 - 18 mmol/L    Glucose 144 (H) 70 - 125 mg/dL    Calcium 7.8 (L) 8.5 - 10.5 mg/dL    BUN 18 8 - 28 mg/dL    Creatinine 1.07 0.60 - 1.10 mg/dL    GFR MDRD Af Amer 59 (L) >60 mL/min/1.73m2    GFR MDRD Non Af Amer 49 (L) >60 mL/min/1.73m2       Lab Results   Component Value Date    WBC 12.2 (H) 03/09/2021    HGB 9.5 (L) 03/09/2021    HCT 30.0 (L) 03/09/2021    MCV 88 03/09/2021     03/09/2021     Lab Results   Component Value Date    HGBA1C 7.1 (H) 02/27/2021         ASSESSMENT:      ICD-10-CM    1. Adnexal mass  N94.89    2. Protein malnutrition (H)  E46    3. Essential hypertension  I10    4. Type 2 diabetes mellitus treated without insulin (H)  E11.9        PLAN:    Had a pleasant visit with her today.  We will do Accu-Cheks at least once a day see what her blood sugars are running and then also give her boost twice daily.  Also remove the PICC line in the right bicep.  Discussed the rehabilitation process also previous laboratory studies.    Documentation from total visit 45 minutes which were 50% initially was spent with patient going over her hospitalization as well as her hernia as well as decisions for future treatments for no treatments whatsoever as well as her nutrition, medications, previous laboratory studies, and rehabilitation services and the remainder the time spent talking with her 2 daughters Meredith and Jillian.      Electronically signed by: Michael Duane Johnson, CNP

## 2021-06-21 NOTE — LETTER
Letter by Johnson, Michael Duane, CNP at      Author: Johnson, Michael Duane, CNP Service: -- Author Type: --    Filed:  Encounter Date: 3/12/2021 Status: (Other)         Patient: Yamila Chavez   MR Number: [id number]   YOB: 1935   Date of Visit: 3/12/2021     [facility]    Facility:   [facility] [[id number]]   Code Status: DNR      CHIEF COMPLAINT/REASON FOR VISIT:  Chief Complaint   Patient presents with   ? Problem Visit     ftt, prot mal, dm       HISTORY:      HPI: Yamila is a 85 y.o. female who I had the pleasure to revisit with today not only secondary to her hospitalization February 26, 2021 secondary generalized weakness and fatigue along with diarrhea however her work-up did include a very large hiatal hernia defect through the entire stomach and transverse colon mass as well as an indeterminant left adnexal mass but also discussing rehabilitation, laboratory studies, blood pressures and nutrition.  Regarding therapy she has initiated therapy she does have generalized weakness.  She does have a wonderful 2 of cirrhosis in her room.  She has been normotensive and afebrile and also on room air.  Weight 91 pounds.  Blood sugars ranging  glipizide at 5 mg and will monitor over the weekend perhaps drop the glipizide altogether.  For pain she did take a Tylenol yesterday for sleep she is on melatonin mirtazapine and trazodone.  She does have a BMP magnesium and white cell ordered for today.  Getting extra nutrient supplements.  We did have a wonderful healthy conversation today regarding palliative and discussion of goals of care including hospice care due to the hospital findings as well as the difficulty for any form of recovery as well as the purpose of hospice and comfort.  She was amenable to the conversation and actually did ask a number of questions and did have a nice reciprocity of information.    Past Medical History:   Diagnosis Date    ? Diabetes mellitus, type II (H)    ? Hyperlipidemia    ? Hypothyroidism    ? Osteoporosis              Family History   Problem Relation Age of Onset   ? Colon cancer Mother 74   ? Breast cancer Sister 48   ? Breast cancer Maternal Aunt    ? BRCA 1/2 Neg Hx    ? Cancer Neg Hx    ? Endometrial cancer Neg Hx    ? Ovarian cancer Neg Hx      Social History     Socioeconomic History   ? Marital status:      Spouse name: Not on file   ? Number of children: Not on file   ? Years of education: Not on file   ? Highest education level: Not on file   Occupational History   ? Not on file   Social Needs   ? Financial resource strain: Not on file   ? Food insecurity     Worry: Not on file     Inability: Not on file   ? Transportation needs     Medical: Not on file     Non-medical: Not on file   Tobacco Use   ? Smoking status: Not on file   Substance and Sexual Activity   ? Alcohol use: Not on file   ? Drug use: Not on file   ? Sexual activity: Not on file   Lifestyle   ? Physical activity     Days per week: Not on file     Minutes per session: Not on file   ? Stress: Not on file   Relationships   ? Social connections     Talks on phone: Not on file     Gets together: Not on file     Attends Gnosticism service: Not on file     Active member of club or organization: Not on file     Attends meetings of clubs or organizations: Not on file     Relationship status: Not on file   ? Intimate partner violence     Fear of current or ex partner: Not on file     Emotionally abused: Not on file     Physically abused: Not on file     Forced sexual activity: Not on file   Other Topics Concern   ? Not on file   Social History Narrative   ? Not on file         Review of Systems  She currently denies any new symptoms of fever chills fatigue cough or cold sore throat postnasal drip wheezing chest pain dizziness vertigo nausea vomiting diarrhea dysuria or headaches.  Does admit to generalized decreased appetite and p.o. intake as well as  interest in nutrition.      Current Outpatient Medications:   ?  acetaminophen (TYLENOL) 325 MG tablet, Take 2 tablets (650 mg total) by mouth every 4 (four) hours as needed (for pain 1-5/10)., Disp:  , Rfl: 0  ?  chlorhexidine (PERIDEX) 0.12 % solution, Apply 15 mL to the mouth or throat 2 (two) times a day for 10 days., Disp: , Rfl: 0  ?  glipiZIDE (GLUCOTROL XL) 5 MG 24 hr tablet, Take 1 tablet (5 mg total) by mouth every evening., Disp: , Rfl: 0  ?  levothyroxine (SYNTHROID, LEVOTHROID) 50 MCG tablet, Take 50 mcg by mouth Daily at 6:00 am., Disp: , Rfl:   ?  mecobalamin, vitamin B12, 1,000 mcg TbDL, Place 1 tablet (1,000 mcg total) under the tongue daily., Disp:  , Rfl: 0  ?  melatonin 3 mg Tab tablet, Take 1 tablet (3 mg total) by mouth at bedtime., Disp:  , Rfl: 0  ?  metoclopramide (REGLAN) 5 MG tablet, Take 1 tablet (5 mg total) by mouth 3 (three) times a day before meals for 10 days., Disp: , Rfl: 0  ?  mirtazapine (REMERON) 7.5 MG tablet, Take 1 tablet (7.5 mg total) by mouth at bedtime., Disp:  , Rfl: 0  ?  multivitamin with minerals (THERA-M) 9 mg iron-400 mcg Tab tablet, Take 1 tablet by mouth daily., Disp:  , Rfl: 0  ?  omeprazole (PRILOSEC) 20 MG capsule, Take 1 capsule (20 mg total) by mouth daily before breakfast., Disp:  , Rfl: 0  ?  senna-docusate (PERICOLACE) 8.6-50 mg tablet, Take 1 tablet by mouth daily as needed for constipation., Disp:  , Rfl: 0  ?  thiamine 100 MG tablet, Take 1 tablet (100 mg total) by mouth daily., Disp:  , Rfl: 0  ?  traZODone (DESYREL) 50 MG tablet, Take 0.5 tablets (25 mg total) by mouth at bedtime as needed for sleep., Disp: , Rfl: 0  ?  zinc sulfate (ZINCATE) 50 mg zinc (220 mg) capsule, Take 1 capsule (220 mg total) by mouth daily., Disp:  , Rfl: 0    There were no vitals filed for this visit.  Blood pressure 126/65, pulse 80, respirations 16 and temperature 97.7  Physical Exam  Pleasant female in no acute distress.  Head is normocephalic.  Conjunctiva is pink and  sclerae clear.  Neck supple without adenopathy.  No thyroid nodules or thyroidomegaly.  Lung sounds are clear throughout.  Cardiovascular S1-S2 regular rate and rhythm.  No lower extremity edema.  Gastrointestinal soft nondistended nontender.  Musculoskeletal generalized weakness and deconditioning.  Denies discomfort.  Psychiatric: Pleasant affect.    LABS:   Lab Results   Component Value Date    ALT 25 03/01/2021    AST 20 03/01/2021    ALKPHOS 47 03/01/2021    BILITOT 0.4 03/01/2021     Results for orders placed or performed during the hospital encounter of 02/26/21   Basic Metabolic Panel   Result Value Ref Range    Sodium 141 136 - 145 mmol/L    Potassium 4.6 3.5 - 5.0 mmol/L    Chloride 111 (H) 98 - 107 mmol/L    CO2 25 22 - 31 mmol/L    Anion Gap, Calculation 5 5 - 18 mmol/L    Glucose 144 (H) 70 - 125 mg/dL    Calcium 7.8 (L) 8.5 - 10.5 mg/dL    BUN 18 8 - 28 mg/dL    Creatinine 1.07 0.60 - 1.10 mg/dL    GFR MDRD Af Amer 59 (L) >60 mL/min/1.73m2    GFR MDRD Non Af Amer 49 (L) >60 mL/min/1.73m2       Lab Results   Component Value Date    ALBUMIN 1.9 (L) 03/01/2021         ASSESSMENT:      ICD-10-CM    1. Paraesophageal hiatal hernia  K44.9    2. Type 2 diabetes mellitus treated without insulin (H)  E11.9    3. Goals of care, counseling/discussion  Z71.89    4. Essential hypertension  I10        PLAN:    We did have a healthy conversation today and overall she is not opposed to the hospice idea but would still like to contemplate and consider her options over the weekend and continue with therapy through the weekend.  In the meantime we will continue to manage and follow her chronic medical conditions as well as her wishes.    For documentation purposes total visit 35 minutes which over 50% was spent with the patient going over her care, medications, previous laboratory studies, nutrition extra nutrient supplements, pain management as well as discussion of palliative and hospice care.      Electronically  signed by: Michael Duane Johnson, CNP

## 2021-06-21 NOTE — LETTER
Letter by Myriam Whitehead CNP at      Author: Myriam Whitehead CNP Service: -- Author Type: --    Filed:  Encounter Date: 3/16/2021 Status: (Other)         Patient: Yamila Chavez   MR Number: [id number]   YOB: 1935   Date of Visit: 3/16/2021     Code Status:  DNR  Visit Type: Discharge Summary     Facility:  [facility] [[id number]]         Facility Type:  (Long Term Care, LTC)  PCP:  Myriam Whitehead CNP    Phone:  [phone]    Fax:  [phone]      Discharge Diagnosis:  Generalized weakness and fatigue with FTT    History of Present Illness: Yamila Chavez is a 85 y.o. female who I am seeing today for discharge from LTC. Pt recently admitted to LTC from the TCU.  Patient recently hospitalized on 2/26/2021 secondary to generalized weakness and fatigue along with diarrhea.  Past medical history includes severe malnutrition, early dementia, acute kidney injury on chronic kidney disease stage III, colitis and hiatal hernia. Patient found to have a large esophageal hiatal hernia with intrathoracic stomach and part of the transverse colon.  This has been present for many years.  She was placed on PPI.  Patient declined surgery. This could be contributory to her severe malnutrition.  Patient with weight loss of 4.6 pounds in less than 1 month.  Decreased oral intake.  Severe fat loss.  Loss of taste progressive.  Covid negative.  Thiamine replaced.  B12 replaced.  Patient started on zinc.  Swallow study showed patient should avoid straws.  Complex left cystic adnexal mass.  TVUS showed 9.4 x 5.8 x 6.2 cm complex cystic mass with few thin septations.  Left ovary absent with vascularity along the periphery of complex cystic structure.   >23.  Diarrhea with colitis. CT showed apparent mural thickening throughout the rectum and possibly the ascending colon. Unable to collect specimens as no further diarrhea.  Acute kidney injury on chronic kidney disease  stage III.  CTA of the abdomen pelvis without contrast showed no obstruction or hydronephrosis.  Her creatinine peaked to 2.35.  Metabolic acidosis secondary to renal failure diarrhea.  This did resolve with fluids.  Type 2 diabetes mellitus without long-term insulin.  Hemoglobin A1c 7.1 on 2/27/2021.  Glipizide held secondary to malnutrition.  Hypertension.  Losartan held.  Hypothyroidism on supplement.  TSH on 2/21 was 2.5.  Cognitive impairment with acute mild encephalopathy.  Slums 13.  CT of the head negative.  B12 low normal and replaced.  Folate normal.  Thiamine replaced.  Patient started on Remeron 7.5 mg.  Vitamin B12 insufficiency.  B12 supplemented.    Today patient sitting up in wheelchair.  Cognitive impairment noted.  She is very thin and frail.  Nursing staff report continued poor oral intake.  She continues on Tylenol for pain.  Continues on Remeron for appetite stimulant.  I did talk with both nursing and the  today.  Patient plans to discharge home on hospice.  They have hired round-the-clock care.  We are recommending 24-hour care.  Blood pressure satisfactory.  TSH on supplement.  Nursing staff reports she is having regular bowel movements.  She continues on Peridex mouthwash for poor dentition.  Patient oral supplements must of been discontinued during her TCU stay for she is no longer on vitamin B12, thiamine or zinc.      Active Ambulatory Problems     Diagnosis Date Noted   ? Mammogram - Abnormal    ? Vitamin D Deficiency    ? Constipation    ? Urinary Incontinence    ? Allergies    ? Postherpetic Neuralgia    ? 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 Hypothyroidism    ? Essential hypertension    ? Urinary Frequency    ? Osteoporosis    ? Hyperlipidemia    ? Herpes Zoster (Shingles)    ? Adenocarcinoma Of The Large Intestine    ? Anxiety    ? Arthritis    ? Type 2 diabetes mellitus treated without insulin (H)    ? Acute renal failure  superimposed on stage 3 chronic kidney disease, unspecified acute renal failure type, unspecified whether stage 3a or 3b CKD (H) 02/26/2021   ? Diarrhea 02/27/2021   ? Adnexal cyst 02/27/2021   ? Paraesophageal hiatal hernia    ? Encounter for palliative care    ? Goals of care, counseling/discussion    ? Adnexal mass 03/10/2021     Resolved Ambulatory Problems     Diagnosis Date Noted   ? Fatigue      Past Medical History:   Diagnosis Date   ? Diabetes mellitus, type II (H)    ? Hyperlipidemia        Discharge Medications:    Outpatient Encounter Medications as of 3/16/2021   Medication Sig Dispense Refill   ? acetaminophen (TYLENOL) 325 MG tablet Take 2 tablets (650 mg total) by mouth every 4 (four) hours as needed (for pain 1-5/10).  0   ? chlorhexidine (PERIDEX) 0.12 % solution Apply 15 mL to the mouth or throat 2 (two) times a day for 10 days.  0   ? levothyroxine (SYNTHROID, LEVOTHROID) 50 MCG tablet Take 50 mcg by mouth Daily at 6:00 am.     ? metoclopramide (REGLAN) 5 MG tablet Take 1 tablet (5 mg total) by mouth 3 (three) times a day before meals for 10 days.  0   ? omeprazole (PRILOSEC) 20 MG capsule Take 1 capsule (20 mg total) by mouth daily before breakfast.  0   ? senna-docusate (PERICOLACE) 8.6-50 mg tablet Take 1 tablet by mouth daily as needed for constipation.  0   ? thiamine 100 MG tablet Take 1 tablet (100 mg total) by mouth daily.  0   ? traZODone (DESYREL) 50 MG tablet Take 0.5 tablets (25 mg total) by mouth at bedtime as needed for sleep.  0     No facility-administered encounter medications on file as of 3/16/2021.      Allergies   Allergen Reactions   ? Atorvastatin    ? Hydrocodone-Acetaminophen          Review of Systems   No fevers or chills. No headache, lightheadedness or dizziness. No SOB, chest pains or palpitations. Appetite is poor.  No nausea, vomiting, constipation or diarrhea. No dysuria, frequency, burning or pain with urination.  Patient denies any pain.  Otherwise review of  systems are negative.     Physical Exam   PHYSICAL EXAMINATION:  Vital signs: /70, pulse 83, respirations 20, temperature 98.3, O2 sat 90% on room air.  Weight 91.8 pounds.  General: Awake, Alert, oriented x1, appropriately, follows simple commands  HEENT:PERRLA, Pink conjunctiva, anicteric sclerae, dry oral mucosa.  Poor dentition  NECK: Supple, without any lymphadenopathy, or masses  CVS:  S1  S2, without murmur or gallop.   LUNG: Clear to auscultation, No wheezes, rales or rhonci.  Somewhat diminished in the bases.  BACK: Moderate kyphosis of the thoracic spine  ABDOMEN: Soft, thin, nontender to palpation, with positive bowel sounds  EXTREMITIES: Moves both upper and lower extremities with diffuse weakness, trace pedal edema, no calf tenderness.  Thin and frail extremities.  SKIN: Warm and dry  NEUROLOGIC: Intact, pulses palpable  PSYCHIATRIC: Cognitive impairment noted.            Labs:  All labs reviewed in the nursing home record.    Assessment and Plan:  1. Paraesophageal hiatal hernia  Pt plans to sign onto hospice upon discharge home.    2. Adnexal mass  Family is going to forgo follow up.    3. Protein malnutrition (H)  Continues with poor oral nutrition. Started on Remeron for appetite stimulant.    4. Acute kidney injury superimposed on chronic kidney disease (H)  Creatine 0.89.    5. Type 2 diabetes mellitus treated without insulin (H)  Diet liberalized 2/t to poor oral intake.    6. Acquired hypothyroidism  Continues on supplement.    7. Essential hypertension  Satisfactory controlled.    8. Failure to thrive (0-17)       Ok to discharge home with current meds. Hospice eval and treat. We are recommending 24 hour care.       Greater than 35 minutes spent in coordination of care including interviewing pt and collaborating with nursing staff and  for discharge.     Electronically signed by: Myriam Whitehead, ABRAHAM

## 2021-06-21 NOTE — LETTER
Letter by Johnson, Michael Duane, CNP at      Author: Johnson, Michael Duane, CNP Service: -- Author Type: --    Filed:  Encounter Date: 3/15/2021 Status: (Other)         Patient: Yamila Chavez   MR Number: [id number]   YOB: 1935   Date of Visit: 3/15/2021     [facility]    Facility:   [facility] [[id number]]   Code Status: DNR      CHIEF COMPLAINT/REASON FOR VISIT:  Chief Complaint   Patient presents with   ? Problem Visit     medm, mgmt, rehab, sleep       HISTORY:      HPI: Yamila is a 85 y.o. female who remains in the transitional care unit room 210 and who I had the opportunity to revisit with not only secondary to her hospitalization February 26, 2021 secondary generalized weakness and fatigue as well as a very large hiatal hernia defect through the entire stomach and transverse colon mass and also a indeterminate left adnexal mass but also discussed her medications, diabetes, nutrition and goals of care.  Regarding the goals of care she at this point is considering hospice type of care but did not want to make that decision herself today which I certainly on an respect and she wants to talk to family members.  She does admit to generalized decline and generalized fatigue and overall not getting any better.  In the meantime does not have a significant appetite whatsoever.  There have not been any recent weights.  Her blood sugars have been less than 140 so the glipizide will not be discontinued including the Accu-Cheks.  Last Tylenol given as needed was on March 12.  I will discontinue the melatonin and mirtazapine.  She does not feel depressed but the mirtazapine was given to her with the hope of the side effect of improving her appetite.  She does not want the melatonin.  She also would like to discontinue the B12, multivitamin, thiamine, and zinc.  She is even thinking about discontinue the Synthroid but we will keep that on the docket for little  while longer.  Otherwise has had meetings with family and pretty much is bedbound at this time.    Past Medical History:   Diagnosis Date   ? Diabetes mellitus, type II (H)    ? Hyperlipidemia    ? Hypothyroidism    ? Osteoporosis              Family History   Problem Relation Age of Onset   ? Colon cancer Mother 74   ? Breast cancer Sister 48   ? Breast cancer Maternal Aunt    ? BRCA 1/2 Neg Hx    ? Cancer Neg Hx    ? Endometrial cancer Neg Hx    ? Ovarian cancer Neg Hx      Social History     Socioeconomic History   ? Marital status:      Spouse name: Not on file   ? Number of children: Not on file   ? Years of education: Not on file   ? Highest education level: Not on file   Occupational History   ? Not on file   Social Needs   ? Financial resource strain: Not on file   ? Food insecurity     Worry: Not on file     Inability: Not on file   ? Transportation needs     Medical: Not on file     Non-medical: Not on file   Tobacco Use   ? Smoking status: Not on file   Substance and Sexual Activity   ? Alcohol use: Not on file   ? Drug use: Not on file   ? Sexual activity: Not on file   Lifestyle   ? Physical activity     Days per week: Not on file     Minutes per session: Not on file   ? Stress: Not on file   Relationships   ? Social connections     Talks on phone: Not on file     Gets together: Not on file     Attends [employer] service: Not on file     Active member of club or organization: Not on file     Attends meetings of clubs or organizations: Not on file     Relationship status: Not on file   ? Intimate partner violence     Fear of current or ex partner: Not on file     Emotionally abused: Not on file     Physically abused: Not on file     Forced sexual activity: Not on file   Other Topics Concern   ? Not on file   Social History Narrative   ? Not on file         Review of Systems  She currently denies any new symptoms of fever chills fatigue cough or cold sore throat postnasal drip wheezing chest pain  dizziness vertigo nausea vomiting diarrhea dysuria or headaches.  Does admit to generalized decreased appetite and p.o. intake as well as interest in nutrition.      Current Outpatient Medications:   ?  acetaminophen (TYLENOL) 325 MG tablet, Take 2 tablets (650 mg total) by mouth every 4 (four) hours as needed (for pain 1-5/10)., Disp:  , Rfl: 0  ?  chlorhexidine (PERIDEX) 0.12 % solution, Apply 15 mL to the mouth or throat 2 (two) times a day for 10 days., Disp: , Rfl: 0  ?  levothyroxine (SYNTHROID, LEVOTHROID) 50 MCG tablet, Take 50 mcg by mouth Daily at 6:00 am., Disp: , Rfl:   ?  metoclopramide (REGLAN) 5 MG tablet, Take 1 tablet (5 mg total) by mouth 3 (three) times a day before meals for 10 days., Disp: , Rfl: 0  ?  omeprazole (PRILOSEC) 20 MG capsule, Take 1 capsule (20 mg total) by mouth daily before breakfast., Disp:  , Rfl: 0  ?  senna-docusate (PERICOLACE) 8.6-50 mg tablet, Take 1 tablet by mouth daily as needed for constipation., Disp:  , Rfl: 0  ?  thiamine 100 MG tablet, Take 1 tablet (100 mg total) by mouth daily., Disp:  , Rfl: 0  ?  traZODone (DESYREL) 50 MG tablet, Take 0.5 tablets (25 mg total) by mouth at bedtime as needed for sleep., Disp: , Rfl: 0    There were no vitals filed for this visit.  Blood pressure 121/73, pulse 65, temperature 98.2 and saturation room air 97%  Physical Exam  Pleasant female in no acute distress.  Head is normocephalic.  Conjunctiva is pink and sclerae clear.  Neck supple without adenopathy. .  Lung sounds are clear throughout.  Cardiovascular S1-S2 regular rate and rhythm.  No lower extremity edema.  Gastrointestinal soft nondistended nontender.  Musculoskeletal generalized weakness and deconditioning.  Denies discomfort.  Psychiatric: Pleasant affect.      LABS:   Lab Results   Component Value Date    ALBUMIN 1.9 (L) 03/01/2021     Results for orders placed or performed in visit on 03/12/21   Basic Metabolic Panel   Result Value Ref Range    Sodium 140 136 - 145  mmol/L    Potassium 3.9 3.5 - 5.0 mmol/L    Chloride 111 (H) 98 - 107 mmol/L    CO2 24 22 - 31 mmol/L    Anion Gap, Calculation 5 5 - 18 mmol/L    Glucose 58 (LL) 70 - 125 mg/dL    Calcium 7.9 (L) 8.5 - 10.5 mg/dL    BUN 18 8 - 28 mg/dL    Creatinine 0.89 0.60 - 1.10 mg/dL    GFR MDRD Af Amer >60 >60 mL/min/1.73m2    GFR MDRD Non Af Amer 60 (L) >60 mL/min/1.73m2           ASSESSMENT:      ICD-10-CM    1. Paraesophageal hiatal hernia  K44.9    2. Goals of care, counseling/discussion  Z71.89    3. Type 2 diabetes mellitus treated without insulin (H)  E11.9    4. Acquired hypothyroidism  E03.9        PLAN:    She at this point would like to discontinue a number of medications that I aforementioned.  I did try to call the daughter Jillian today but there was no answer.  She is strongly considering hospice due to at this point unable to withstand a major surgery to fix her hiatal hernia as well as the uncertainty of the adnexal mass.  Her appetite is not significant whatsoever and barely getting even in the nutrient supplements.  She states that she is comfortable.  She will let me know in the near future what her plans are.        Electronically signed by: Michael Duane Johnson, ABRAHAM

## 2021-07-13 ENCOUNTER — RECORDS - HEALTHEAST (OUTPATIENT)
Dept: ADMINISTRATIVE | Facility: CLINIC | Age: 86
End: 2021-07-13

## 2021-07-21 ENCOUNTER — RECORDS - HEALTHEAST (OUTPATIENT)
Dept: ADMINISTRATIVE | Facility: CLINIC | Age: 86
End: 2021-07-21

## 2021-07-22 ENCOUNTER — RECORDS - HEALTHEAST (OUTPATIENT)
Dept: SCHEDULING | Facility: CLINIC | Age: 86
End: 2021-07-22

## 2021-07-22 DIAGNOSIS — Z12.31 OTHER SCREENING MAMMOGRAM: ICD-10-CM
